# Patient Record
Sex: FEMALE | Employment: PART TIME | URBAN - METROPOLITAN AREA
[De-identification: names, ages, dates, MRNs, and addresses within clinical notes are randomized per-mention and may not be internally consistent; named-entity substitution may affect disease eponyms.]

---

## 2018-04-26 ENCOUNTER — HOSPITAL ENCOUNTER (EMERGENCY)
Facility: HOSPITAL | Age: 23
Discharge: HOME | End: 2018-04-26
Attending: EMERGENCY MEDICINE
Payer: COMMERCIAL

## 2018-04-26 VITALS
WEIGHT: 175 LBS | HEART RATE: 96 BPM | OXYGEN SATURATION: 97 % | DIASTOLIC BLOOD PRESSURE: 59 MMHG | TEMPERATURE: 98 F | RESPIRATION RATE: 18 BRPM | BODY MASS INDEX: 27.47 KG/M2 | SYSTOLIC BLOOD PRESSURE: 114 MMHG | HEIGHT: 67 IN
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
## 2018-04-26 DIAGNOSIS — S00.212A ABRASION OF LEFT EYELID, INITIAL ENCOUNTER: Primary | ICD-10-CM

## 2018-04-26 LAB
B-HCG UR QL: NEGATIVE
CTP QC/QA: YES

## 2018-04-26 PROCEDURE — 63600175 PHARM REV CODE 636 W HCPCS: Performed by: NURSE PRACTITIONER

## 2018-04-26 PROCEDURE — 81025 URINE PREGNANCY TEST: CPT | Performed by: PHYSICIAN ASSISTANT

## 2018-04-26 PROCEDURE — 96372 THER/PROPH/DIAG INJ SC/IM: CPT

## 2018-04-26 PROCEDURE — 99283 EMERGENCY DEPT VISIT LOW MDM: CPT | Mod: 25

## 2018-04-26 RX ORDER — NORGESTIMATE AND ETHINYL ESTRADIOL 0.25-0.035
1 KIT ORAL DAILY
COMMUNITY
End: 2018-10-01 | Stop reason: SDUPTHER

## 2018-04-26 RX ORDER — TIZANIDINE 4 MG/1
4 TABLET ORAL EVERY 8 HOURS PRN
Qty: 20 TABLET | Refills: 0 | Status: SHIPPED | OUTPATIENT
Start: 2018-04-26 | End: 2018-10-01

## 2018-04-26 RX ORDER — NAPROXEN 500 MG/1
500 TABLET ORAL 2 TIMES DAILY PRN
Qty: 30 TABLET | Refills: 0 | Status: SHIPPED | OUTPATIENT
Start: 2018-04-26 | End: 2018-10-01

## 2018-04-26 RX ORDER — KETOROLAC TROMETHAMINE 30 MG/ML
30 INJECTION, SOLUTION INTRAMUSCULAR; INTRAVENOUS
Status: COMPLETED | OUTPATIENT
Start: 2018-04-26 | End: 2018-04-26

## 2018-04-26 RX ORDER — ORPHENADRINE CITRATE 30 MG/ML
30 INJECTION INTRAMUSCULAR; INTRAVENOUS
Status: DISCONTINUED | OUTPATIENT
Start: 2018-04-26 | End: 2018-04-26 | Stop reason: HOSPADM

## 2018-04-26 RX ADMIN — KETOROLAC TROMETHAMINE 30 MG: 30 INJECTION, SOLUTION INTRAMUSCULAR at 02:04
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
## 2018-04-26 NOTE — DISCHARGE INSTRUCTIONS
Tick pain medications as needed only as prescribed.    Follow-up with your primary physician as needed.    Continue to use Neosporin and clean abrasions with soap and water.    Return to the emergency department for any new or worsening symptoms or as needed.
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
## 2019-11-04 ENCOUNTER — HOSPITAL ENCOUNTER (EMERGENCY)
Facility: HOSPITAL | Age: 24
Discharge: HOME | End: 2019-11-04
Attending: EMERGENCY MEDICINE
Payer: COMMERCIAL

## 2019-11-04 VITALS
BODY MASS INDEX: 23.54 KG/M2 | HEIGHT: 67 IN | RESPIRATION RATE: 18 BRPM | DIASTOLIC BLOOD PRESSURE: 79 MMHG | OXYGEN SATURATION: 99 % | SYSTOLIC BLOOD PRESSURE: 116 MMHG | TEMPERATURE: 99 F | HEART RATE: 78 BPM | WEIGHT: 150 LBS

## 2019-11-04 DIAGNOSIS — H93.12 EAR RINGING SOUND, LEFT: ICD-10-CM

## 2019-11-04 DIAGNOSIS — S16.1XXA CERVICAL STRAIN, ACUTE, INITIAL ENCOUNTER: Primary | ICD-10-CM

## 2019-11-04 DIAGNOSIS — H65.93 MIDDLE EAR EFFUSION, BILATERAL: ICD-10-CM
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
## 2019-11-04 DIAGNOSIS — S40.812A ABRASION OF LEFT UPPER EXTREMITY, INITIAL ENCOUNTER: ICD-10-CM

## 2019-11-04 LAB
B-HCG UR QL: NEGATIVE
CTP QC/QA: YES

## 2019-11-04 PROCEDURE — 81025 URINE PREGNANCY TEST: CPT | Performed by: NURSE PRACTITIONER

## 2019-11-04 PROCEDURE — 25000003 PHARM REV CODE 250: Performed by: NURSE PRACTITIONER

## 2019-11-04 PROCEDURE — 99283 EMERGENCY DEPT VISIT LOW MDM: CPT | Mod: 25

## 2019-11-04 RX ORDER — IBUPROFEN 400 MG/1
800 TABLET ORAL
Status: COMPLETED | OUTPATIENT
Start: 2019-11-04 | End: 2019-11-04

## 2019-11-04 RX ADMIN — IBUPROFEN 800 MG: 400 TABLET, FILM COATED ORAL at 10:11

## 2019-11-04 NOTE — DISCHARGE INSTRUCTIONS
Take ibuprofen or tylenol as needed for pain  You can take zyrtec to help with fluid behind ear which can be causing the intermittent ringing- if the problem continues, please follow up with an ENT
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
## 2019-11-15 DIAGNOSIS — M54.2 CERVICALGIA: Primary | ICD-10-CM

## 2019-12-06 ENCOUNTER — CLINICAL SUPPORT (OUTPATIENT)
Dept: REHABILITATION | Facility: HOSPITAL | Age: 24
End: 2019-12-06
Payer: MEDICAID

## 2019-12-06 DIAGNOSIS — M54.2 NECK PAIN: ICD-10-CM

## 2019-12-06 DIAGNOSIS — M62.89 MUSCLE TIGHTNESS: ICD-10-CM

## 2019-12-06 DIAGNOSIS — R29.898 DECREASED ROM OF NECK: ICD-10-CM

## 2019-12-06 PROCEDURE — 97161 PT EVAL LOW COMPLEX 20 MIN: CPT | Mod: PN

## 2019-12-06 PROCEDURE — 97110 THERAPEUTIC EXERCISES: CPT | Mod: PN
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
## 2020-01-09 ENCOUNTER — CLINICAL SUPPORT (OUTPATIENT)
Dept: REHABILITATION | Facility: HOSPITAL | Age: 25
End: 2020-01-09
Payer: MEDICAID

## 2020-01-09 DIAGNOSIS — R29.898 DECREASED ROM OF NECK: ICD-10-CM

## 2020-01-09 DIAGNOSIS — M54.2 NECK PAIN: ICD-10-CM

## 2020-01-09 DIAGNOSIS — M62.89 MUSCLE TIGHTNESS: ICD-10-CM

## 2020-01-09 PROCEDURE — 97110 THERAPEUTIC EXERCISES: CPT | Mod: PN

## 2020-01-09 NOTE — PROGRESS NOTES
"  Physical Therapy Daily Treatment Note     Name: Ata Chavez  Clinic Number: [id number]    Therapy Diagnosis: No diagnosis found.  Physician: Macy Rodriguez MD    Visit Date: 1/9/2020    Physician Orders: ***  Medical Diagnosis: ***  Evaluation Date: ***  Authorization Period Expiration: ***  Plan of Care Certification Period: ***  Visit #/Visits authorized: ***/ ***     Time In: ***  Time Out: ***  Total Billable Time: *** minutes    Precautions: {IP WOUND PRECAUTIONS OHS:08415}    Subjective     Pt reports: ***.  She {Actions; was/was not:57994} compliant with home exercise program.  Response to previous treatment: ***  Functional change: ***    Pain: {0-10:30493::"0"}/10  Location: {RIGHT/LEFT/BILATERAL:28347} {LOCATION ON BODY:89088}     Objective     Ata received therapeutic exercises to develop strength, endurance, ROM, flexibility, posture and core stabilization for *** minutes including:    UBE 3'/3'  Upper trap st 3x30" ea  Levator scap st 3x30" ea  Seated chin tucks 20x2"  Cervical extension with towel overpressure x20   SNAGs x10, 5 sec hold   Supine sidebending and rotation x20 ea   Rows, RTB, 3x10   Bilateral shoulder extension, RTB, 3x10   No money, YTB, 3x10   Quadruped chin tucks with Y 2x10   Lat pulldown x10#, seated on PB, 3x10   Open Books x20 ea    Ata received the following manual therapy techniques: {AMB PT PROGRESS MANUAL THERAPY:52647} were applied to the: *** for *** minutes, including:    R first rib mobilization  B levator scap contract-relax   C7 mobilizations on R with L c/s rotation  STM c/s paraspinals    Ata participated in neuromuscular re-education activities to improve: {AMB PT PROGRESS NEURO RE-ED:94054} for *** minutes. The following activities were included:  ***    Ata participated in dynamic functional therapeutic activities to improve functional performance for ***  minutes, including:  ***    Ata participated in gait training to improve functional " "mobility and safety for ***  minutes, including:  ***    Ata received the following direct contact modalities after being cleared for contraindications: {AMB PT PROGRESS DIRECT CONTACT MODES:88195}    Shaileshisen received the following supervised modalities after being cleared for contradictions: {AMB PT SUPERVISED MODES:10225}    Allisen received hot pack for *** minutes to ***.    Allisen received cold pack for *** minutes to ***.      Home Exercises Provided and Patient Education Provided     Education provided:   - ***    Written Home Exercises Provided: {Blank single:19429::"yes","Patient instructed to cont prior HEP"}.  Exercises were reviewed and Ata was able to demonstrate them prior to the end of the session.  Ata demonstrated {Desc; good/fair/poor:02868} understanding of the education provided.     See EMR under {Blank single:81636::"Media","Patient Instructions"} for exercises provided {Blank single:93295::"1/9/2020","prior visit"}.    Assessment     ***  Ata {IS/IS NOT:92318} progressing well towards her goals.   Pt prognosis is Good.     Pt will continue to benefit from skilled outpatient physical therapy to address the deficits listed in the problem list box on initial evaluation, provide pt/family education and to maximize pt's level of independence in the home and community environment.     Pt's spiritual, cultural and educational needs considered and pt agreeable to plan of care and goals.     Anticipated barriers to physical therapy: none    Goals:  Short Term Goals: 3 weeks  1. Patient will be proficient and compliant in HEP  2. Improve L cervical rotation by >/= 10 degrees in order to improve functional mobility.  3. Patient will be independent in postural awareness.     Long Term Goals: 6 weeks  1. Abolish all neck pain  2. Patient will demonstrate WNL neck ROM in order to improve functional mobility.  3. Patient will report no greater than 1 headache per week.     Plan "     ***    Kristin Landrum, PT   01/09/2020

## 2020-01-09 NOTE — PROGRESS NOTES
"  Physical Therapy Daily Treatment Note     Name: Ata Chavez  Clinic Number: [id number]    Therapy Diagnosis:   Encounter Diagnoses   Name Primary?    Neck pain     Decreased ROM of neck     Muscle tightness      Physician: Macy Rodriguez MD    Visit Date: 1/9/2020    Physician Orders: PT Eval and Treat  Medical Diagnosis: Cervicalgia  Evaluation Date: 12/6/2019  Authorization period: 12/31/19  Plan of care expiration: 1/20/20  Visit # authorized: 1/1    Time In: 2:23p  Time Out: 3:00p  Total Billable Time: 20 minutes    Precautions: Standard    Subjective     Pt reports: she has been feeling really good since the first day of treatment. She feels almost back to normal.   She was compliant with home exercise program.  Response to previous treatment: good  Functional change: no pain    Pain: 0/10  Location: n/a    Objective     Cervical ROM: (measured in degrees)    Degrees Quality   extension 70 none   Flexion 60     Right Rotation 80     Left Rotation 80     Right SB 50     Left SB 50      Ata received therapeutic exercises to develop strength, endurance, ROM, flexibility, posture and core stabilization for 37 minutes including:    UBE 2'/2'  Upper trap st 3x30" ea  Levator scap st 3x30" ea  Seated chin tucks 20x2"  Cervical extension with towel overpressure x20   SNAGs x10, 5 sec hold   Supine sidebending and rotation x20 ea   Rows, GTB, 3x10   Bilateral shoulder extension, GTB, 3x10   No money, YTB, 3x10   Quadruped chin tucks with Y 2x10   Lat pulldown x10#, seated on PB, 3x10   Open Books x20 ea    Ata received the following manual therapy techniques: Joint mobilizations and Soft tissue Mobilization were applied to the: cervical spine for 00 minutes, including:    R first rib mobilization  B levator scap contract-relax   C7 mobilizations on R with L c/s rotation  STM c/s paraspinals    Home Exercises Provided and Patient Education Provided     Education provided:   - continuing with mobility " and strengthening exercises on her own     Written Home Exercises Provided: Patient instructed to cont prior HEP.  Exercises were reviewed and Ata was able to demonstrate them prior to the end of the session.  Ata demonstrated good  understanding of the education provided.     See EMR under Patient Instructions for exercises provided 12/6/2019.    Assessment     This is patient's first appointment since her initial evaluation on December 6, 2019. Pt feels better and doesn't have many pain complaints. She was able to perform all exercises well and would benefit from self-management of symptoms with exercise routine. Pt wants to be discharged at this time.   Ata is progressing well towards her goals.   Pt prognosis is Good.     Pt will continue to benefit from skilled outpatient physical therapy to address the deficits listed in the problem list box on initial evaluation, provide pt/family education and to maximize pt's level of independence in the home and community environment.   Pt's spiritual, cultural and educational needs considered and pt agreeable to plan of care and goals.     Anticipated barriers to physical therapy: none    Goals:   Short Term Goals: 3 weeks  1. Patient will be proficient and compliant in HEP. Goal met   2. Improve L cervical rotation by >/= 10 degrees in order to improve functional mobility. Goal met  3. Patient will be independent in postural awareness. Goal met     Long Term Goals: 6 weeks  1. Abolish all neck pain. Goal met  2. Patient will demonstrate WNL neck ROM in order to improve functional mobility. Goal met  3. Patient will report no greater than 1 headache per week. Goal met    Plan     Pt is discharged from therapy today.     Kristin Landrum, PT   01/09/2020